# Patient Record
Sex: FEMALE | URBAN - METROPOLITAN AREA
[De-identification: names, ages, dates, MRNs, and addresses within clinical notes are randomized per-mention and may not be internally consistent; named-entity substitution may affect disease eponyms.]

---

## 2020-09-25 ENCOUNTER — LAB OUTSIDE AN ENCOUNTER (OUTPATIENT)
Dept: URBAN - METROPOLITAN AREA TELEHEALTH 2 | Facility: TELEHEALTH | Age: 30
End: 2020-09-25

## 2020-09-25 ENCOUNTER — OFFICE VISIT (OUTPATIENT)
Dept: URBAN - METROPOLITAN AREA TELEHEALTH 2 | Facility: TELEHEALTH | Age: 30
End: 2020-09-25
Payer: COMMERCIAL

## 2020-09-25 ENCOUNTER — DASHBOARD ENCOUNTERS (OUTPATIENT)
Age: 30
End: 2020-09-25

## 2020-09-25 DIAGNOSIS — K62.5 RECTAL BLEEDING: ICD-10-CM

## 2020-09-25 DIAGNOSIS — R10.84 ABDOMINAL PAIN, GENERALIZED: ICD-10-CM

## 2020-09-25 DIAGNOSIS — R19.7 DIARRHEA, UNSPECIFIED TYPE: ICD-10-CM

## 2020-09-25 DIAGNOSIS — R14.0 BLOATING: ICD-10-CM

## 2020-09-25 PROCEDURE — 99442 PHONE E/M BY PHYS 11-20 MIN: CPT | Performed by: INTERNAL MEDICINE

## 2020-09-25 RX ORDER — ASPIRIN 81 MG
AS DIRECTED TABLET, DELAYED RELEASE (ENTERIC COATED) ORAL
Status: ACTIVE | COMMUNITY

## 2020-09-25 NOTE — HPI-TODAY'S VISIT:
Ms. Hue Rehman is a 29 year old female here for abdominal pain and bloating. She has had issues with bloatign and gas since college. 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 She had an IUD placed 5 years ago and kept having infections. She has been taking a daily probiotic, Klean [employer]lete, for the last 5 years. She has not noticed any change in her GI symptoms. Her bowels are normal once in the morning and then loose to diarrhea after training. She has had blood in her stool and noted dripping into the toliet. This is intermittent. She has noticed that her bloating is worse in the morning and seems to improve some after a BM. Two weeks ago, she ate breakfast and had an intense leftsided pain under the rib cage that moved down to her hip. She laid down and over a period of time it subsided. 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 She has had symptoms for some long and getting frusted as they seem to be getting worse. She denies any changes in her weigth or appetite. She denies any nausea or vomiting. She denies any family hx of IBD or celiac. She has had recent normal labs. She is going to be out of the [facility] for over a month. She leaves early October. CS

## 2020-10-01 ENCOUNTER — OFFICE VISIT (OUTPATIENT)
Dept: URBAN - NONMETROPOLITAN AREA SURGERY CENTER 1 | Facility: SURGERY CENTER | Age: 30
End: 2020-10-01

## 2020-10-01 LAB
DEAMIDATED GLIADIN ABS, IGA: 3
DEAMIDATED GLIADIN ABS, IGG: 3
ENDOMYSIAL ANTIBODY IGA: NEGATIVE
IMMUNOGLOBULIN A, QN, SERUM: 71
T-TRANSGLUTAMINASE (TTG) IGA: <2
T-TRANSGLUTAMINASE (TTG) IGG: 2